# Patient Record
Sex: FEMALE | Race: WHITE | NOT HISPANIC OR LATINO | Employment: STUDENT | URBAN - METROPOLITAN AREA
[De-identification: names, ages, dates, MRNs, and addresses within clinical notes are randomized per-mention and may not be internally consistent; named-entity substitution may affect disease eponyms.]

---
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
## 2019-10-17 ENCOUNTER — DOCTOR'S OFFICE (OUTPATIENT)
Age: 19
Setting detail: OPHTHALMOLOGY
End: 2019-10-17
Payer: COMMERCIAL

## 2019-10-17 DIAGNOSIS — Z41.1: ICD-10-CM

## 2019-10-17 PROCEDURE — NO CHARGE N/C PROFESSIONAL COURTESY: Performed by: OPHTHALMOLOGY

## 2019-10-22 ASSESSMENT — REFRACTION_MANIFEST
OU_VA: 20/
OS_VA1: 20/
OS_VA2: 20/
OD_VA2: 20/
OD_VA1: 20/
OD_VA3: 20/
OD_VA2: 20/
OS_VA3: 20/
OS_VA2: 20/
OD_VA3: 20/
OS_VA1: 20/
OD_VA1: 20/
OU_VA: 20/
OS_VA3: 20/

## 2019-10-22 ASSESSMENT — REFRACTION_CURRENTRX
OD_OVR_VA: 20/
OS_OVR_VA: 20/
OS_OVR_VA: 20/
OD_OVR_VA: 20/
OS_OVR_VA: 20/
OD_OVR_VA: 20/

## 2019-10-22 ASSESSMENT — VISUAL ACUITY
OS_BCVA: 20/
OD_BCVA: 20/

## 2020-02-12 ENCOUNTER — DOCTOR'S OFFICE (OUTPATIENT)
Dept: URBAN - METROPOLITAN AREA CLINIC 150 | Facility: CLINIC | Age: 20
Setting detail: OPHTHALMOLOGY
End: 2020-02-12
Payer: COMMERCIAL

## 2020-02-12 DIAGNOSIS — B30.2: ICD-10-CM

## 2020-02-12 DIAGNOSIS — B30.0: ICD-10-CM

## 2020-02-12 DIAGNOSIS — B30.1: ICD-10-CM

## 2020-02-12 DIAGNOSIS — B30.3: ICD-10-CM

## 2020-02-12 PROCEDURE — NO CHARGE N/C PROFESSIONAL COURTESY: Performed by: OPHTHALMOLOGY

## 2020-02-12 ASSESSMENT — VISUAL ACUITY
OD_BCVA: 20/20
OS_BCVA: 20/20

## 2020-11-09 ENCOUNTER — OPTICAL OFFICE (OUTPATIENT)
Dept: URBAN - METROPOLITAN AREA CLINIC 134 | Facility: CLINIC | Age: 20
Setting detail: OPHTHALMOLOGY
End: 2020-11-09

## 2020-11-09 DIAGNOSIS — H52.223: ICD-10-CM

## 2020-11-09 PROBLEM — B30.1 VIRAL CONJUNCTIVITIS: Status: ACTIVE | Noted: 2020-02-12

## 2020-11-09 PROBLEM — B30.2 VIRAL CONJUNCTIVITIS: Status: ACTIVE | Noted: 2020-02-12

## 2020-11-09 PROBLEM — B30.3 VIRAL CONJUNCTIVITIS: Status: ACTIVE | Noted: 2020-02-12

## 2020-11-09 PROBLEM — B30.0 VIRAL CONJUNCTIVITIS: Status: ACTIVE | Noted: 2020-02-12

## 2020-11-09 PROCEDURE — V2103 SPHEROCYLINDR 4.00D/12-2.00D: HCPCS | Performed by: OPTOMETRIST

## 2020-11-09 PROCEDURE — V2750 ANTI-REFLECTIVE COATING: HCPCS | Performed by: OPTOMETRIST

## 2020-11-09 PROCEDURE — V2020 VISION SVCS FRAMES PURCHASES: HCPCS | Performed by: OPTOMETRIST

## 2020-11-23 ENCOUNTER — OFFICE VISIT (OUTPATIENT)
Dept: URGENT CARE | Facility: CLINIC | Age: 20
End: 2020-11-23
Payer: COMMERCIAL

## 2020-11-23 VITALS — OXYGEN SATURATION: 99 % | HEART RATE: 82 BPM | TEMPERATURE: 98.5 F | RESPIRATION RATE: 18 BRPM

## 2020-11-23 DIAGNOSIS — Z20.822 SUSPECTED COVID-19 VIRUS INFECTION: Primary | ICD-10-CM

## 2020-11-23 PROCEDURE — 99203 OFFICE O/P NEW LOW 30 MIN: CPT | Performed by: NURSE PRACTITIONER

## 2020-11-23 PROCEDURE — U0003 INFECTIOUS AGENT DETECTION BY NUCLEIC ACID (DNA OR RNA); SEVERE ACUTE RESPIRATORY SYNDROME CORONAVIRUS 2 (SARS-COV-2) (CORONAVIRUS DISEASE [COVID-19]), AMPLIFIED PROBE TECHNIQUE, MAKING USE OF HIGH THROUGHPUT TECHNOLOGIES AS DESCRIBED BY CMS-2020-01-R: HCPCS | Performed by: NURSE PRACTITIONER

## 2020-11-24 LAB — SARS-COV-2 RNA SPEC QL NAA+PROBE: NOT DETECTED

## 2020-11-25 ENCOUNTER — TELEPHONE (OUTPATIENT)
Dept: URGENT CARE | Facility: CLINIC | Age: 20
End: 2020-11-25

## 2020-11-29 ENCOUNTER — OFFICE VISIT (OUTPATIENT)
Dept: URGENT CARE | Facility: CLINIC | Age: 20
End: 2020-11-29
Payer: COMMERCIAL

## 2020-11-29 VITALS
BODY MASS INDEX: 20.98 KG/M2 | HEART RATE: 100 BPM | HEIGHT: 62 IN | TEMPERATURE: 98.5 F | RESPIRATION RATE: 18 BRPM | OXYGEN SATURATION: 100 % | DIASTOLIC BLOOD PRESSURE: 81 MMHG | WEIGHT: 114 LBS | SYSTOLIC BLOOD PRESSURE: 141 MMHG

## 2020-11-29 DIAGNOSIS — R30.0 DYSURIA: ICD-10-CM

## 2020-11-29 DIAGNOSIS — R31.9 URINARY TRACT INFECTION WITH HEMATURIA, SITE UNSPECIFIED: Primary | ICD-10-CM

## 2020-11-29 DIAGNOSIS — N39.0 URINARY TRACT INFECTION WITH HEMATURIA, SITE UNSPECIFIED: Primary | ICD-10-CM

## 2020-11-29 LAB
SL AMB  POCT GLUCOSE, UA: NEGATIVE
SL AMB LEUKOCYTE ESTERASE,UA: ABNORMAL
SL AMB POCT BILIRUBIN,UA: NEGATIVE
SL AMB POCT BLOOD,UA: ABNORMAL
SL AMB POCT CLARITY,UA: ABNORMAL
SL AMB POCT COLOR,UA: ABNORMAL
SL AMB POCT KETONES,UA: NEGATIVE
SL AMB POCT NITRITE,UA: POSITIVE
SL AMB POCT PH,UA: 7.5
SL AMB POCT SPECIFIC GRAVITY,UA: 1.02
SL AMB POCT URINE PROTEIN: 30
SL AMB POCT UROBILINOGEN: 0.2

## 2020-11-29 PROCEDURE — 81002 URINALYSIS NONAUTO W/O SCOPE: CPT | Performed by: PHYSICIAN ASSISTANT

## 2020-11-29 PROCEDURE — 87077 CULTURE AEROBIC IDENTIFY: CPT | Performed by: PHYSICIAN ASSISTANT

## 2020-11-29 PROCEDURE — G0382 LEV 3 HOSP TYPE B ED VISIT: HCPCS | Performed by: PHYSICIAN ASSISTANT

## 2020-11-29 PROCEDURE — S9083 URGENT CARE CENTER GLOBAL: HCPCS | Performed by: PHYSICIAN ASSISTANT

## 2020-11-29 PROCEDURE — 87086 URINE CULTURE/COLONY COUNT: CPT | Performed by: PHYSICIAN ASSISTANT

## 2020-11-29 RX ORDER — SULFAMETHOXAZOLE AND TRIMETHOPRIM 800; 160 MG/1; MG/1
1 TABLET ORAL EVERY 12 HOURS SCHEDULED
Qty: 14 TABLET | Refills: 0 | Status: SHIPPED | OUTPATIENT
Start: 2020-11-29 | End: 2020-12-06

## 2020-12-01 LAB — BACTERIA UR CULT: ABNORMAL

## 2020-12-02 ENCOUNTER — TELEPHONE (OUTPATIENT)
Dept: URGENT CARE | Facility: CLINIC | Age: 20
End: 2020-12-02

## 2020-12-02 DIAGNOSIS — R30.0 DYSURIA: Primary | ICD-10-CM

## 2020-12-02 RX ORDER — FLUCONAZOLE 150 MG/1
150 TABLET ORAL ONCE
Qty: 2 TABLET | Refills: 0 | Status: SHIPPED | OUTPATIENT
Start: 2020-12-02 | End: 2020-12-02

## 2022-05-09 ENCOUNTER — OPTICAL OFFICE (OUTPATIENT)
Dept: URBAN - METROPOLITAN AREA CLINIC 134 | Facility: CLINIC | Age: 22
Setting detail: OPHTHALMOLOGY
End: 2022-05-09

## 2022-05-09 DIAGNOSIS — H52.223: ICD-10-CM

## 2022-05-09 PROCEDURE — V2020T NON PRESCRIPTION SUNS: Performed by: OPHTHALMOLOGY

## 2022-10-28 ENCOUNTER — HOSPITAL ENCOUNTER (EMERGENCY)
Facility: HOSPITAL | Age: 22
Discharge: HOME | End: 2022-10-28
Attending: EMERGENCY MEDICINE
Payer: COMMERCIAL

## 2022-10-28 ENCOUNTER — APPOINTMENT (EMERGENCY)
Dept: ULTRASOUND IMAGING | Facility: HOSPITAL | Age: 22
End: 2022-10-28
Payer: COMMERCIAL

## 2022-10-28 VITALS
HEART RATE: 62 BPM | OXYGEN SATURATION: 99 % | TEMPERATURE: 97.8 F | BODY MASS INDEX: 25.76 KG/M2 | DIASTOLIC BLOOD PRESSURE: 72 MMHG | HEIGHT: 62 IN | SYSTOLIC BLOOD PRESSURE: 122 MMHG | RESPIRATION RATE: 18 BRPM | WEIGHT: 140 LBS

## 2022-10-28 DIAGNOSIS — N94.6 DYSMENORRHEA: Primary | ICD-10-CM

## 2022-10-28 LAB
ABO GROUP BLD: NORMAL
ABO GROUP BLD: NORMAL
ANION GAP SERPL CALCULATED.3IONS-SCNC: 7 MMOL/L (ref 4–13)
B-HCG SERPL-ACNC: <1 MIU/ML (ref 0–11.6)
BACTERIA UR QL AUTO: NORMAL /HPF
BASOPHILS # BLD AUTO: 0.03 THOUSANDS/ÂΜL (ref 0–0.1)
BASOPHILS NFR BLD AUTO: 1 % (ref 0–1)
BILIRUB UR QL STRIP: NEGATIVE
BLD GP AB SCN SERPL QL: NEGATIVE
BUN SERPL-MCNC: 11 MG/DL (ref 5–25)
CALCIUM SERPL-MCNC: 9 MG/DL (ref 8.4–10.2)
CHLORIDE SERPL-SCNC: 107 MMOL/L (ref 96–108)
CLARITY UR: CLEAR
CO2 SERPL-SCNC: 25 MMOL/L (ref 21–32)
COLOR UR: ABNORMAL
CREAT SERPL-MCNC: 0.83 MG/DL (ref 0.6–1.3)
EOSINOPHIL # BLD AUTO: 0.09 THOUSAND/ÂΜL (ref 0–0.61)
EOSINOPHIL NFR BLD AUTO: 2 % (ref 0–6)
ERYTHROCYTE [DISTWIDTH] IN BLOOD BY AUTOMATED COUNT: 14 % (ref 11.6–15.1)
GFR SERPL CREATININE-BSD FRML MDRD: 99 ML/MIN/1.73SQ M
GLUCOSE SERPL-MCNC: 101 MG/DL (ref 65–140)
GLUCOSE UR STRIP-MCNC: NEGATIVE MG/DL
HCT VFR BLD AUTO: 43.1 % (ref 34.8–46.1)
HGB BLD-MCNC: 14 G/DL (ref 11.5–15.4)
HGB UR QL STRIP.AUTO: ABNORMAL
IMM GRANULOCYTES # BLD AUTO: 0.01 THOUSAND/UL (ref 0–0.2)
IMM GRANULOCYTES NFR BLD AUTO: 0 % (ref 0–2)
KETONES UR STRIP-MCNC: NEGATIVE MG/DL
LEUKOCYTE ESTERASE UR QL STRIP: NEGATIVE
LYMPHOCYTES # BLD AUTO: 1.9 THOUSANDS/ÂΜL (ref 0.6–4.47)
LYMPHOCYTES NFR BLD AUTO: 31 % (ref 14–44)
MCH RBC QN AUTO: 28.1 PG (ref 26.8–34.3)
MCHC RBC AUTO-ENTMCNC: 32.5 G/DL (ref 31.4–37.4)
MCV RBC AUTO: 86 FL (ref 82–98)
MONOCYTES # BLD AUTO: 0.4 THOUSAND/ÂΜL (ref 0.17–1.22)
MONOCYTES NFR BLD AUTO: 7 % (ref 4–12)
NEUTROPHILS # BLD AUTO: 3.75 THOUSANDS/ÂΜL (ref 1.85–7.62)
NEUTS SEG NFR BLD AUTO: 59 % (ref 43–75)
NITRITE UR QL STRIP: NEGATIVE
NON-SQ EPI CELLS URNS QL MICRO: NORMAL /HPF
NRBC BLD AUTO-RTO: 0 /100 WBCS
PH UR STRIP.AUTO: 6 [PH]
PLATELET # BLD AUTO: 299 THOUSANDS/UL (ref 149–390)
PMV BLD AUTO: 11.3 FL (ref 8.9–12.7)
POTASSIUM SERPL-SCNC: 3.9 MMOL/L (ref 3.5–5.3)
PROT UR STRIP-MCNC: NEGATIVE MG/DL
RBC # BLD AUTO: 4.99 MILLION/UL (ref 3.81–5.12)
RBC #/AREA URNS AUTO: NORMAL /HPF
RH BLD: POSITIVE
RH BLD: POSITIVE
SODIUM SERPL-SCNC: 139 MMOL/L (ref 135–147)
SP GR UR STRIP.AUTO: 1.01 (ref 1–1.03)
SPECIMEN EXPIRATION DATE: NORMAL
UROBILINOGEN UR QL STRIP.AUTO: 0.2 E.U./DL
WBC # BLD AUTO: 6.18 THOUSAND/UL (ref 4.31–10.16)
WBC #/AREA URNS AUTO: NORMAL /HPF

## 2022-10-28 PROCEDURE — 80048 BASIC METABOLIC PNL TOTAL CA: CPT | Performed by: PHYSICIAN ASSISTANT

## 2022-10-28 PROCEDURE — 36415 COLL VENOUS BLD VENIPUNCTURE: CPT | Performed by: PHYSICIAN ASSISTANT

## 2022-10-28 PROCEDURE — 85025 COMPLETE CBC W/AUTO DIFF WBC: CPT | Performed by: PHYSICIAN ASSISTANT

## 2022-10-28 PROCEDURE — 84702 CHORIONIC GONADOTROPIN TEST: CPT | Performed by: PHYSICIAN ASSISTANT

## 2022-10-28 PROCEDURE — 86901 BLOOD TYPING SEROLOGIC RH(D): CPT | Performed by: PHYSICIAN ASSISTANT

## 2022-10-28 PROCEDURE — 76856 US EXAM PELVIC COMPLETE: CPT

## 2022-10-28 PROCEDURE — 76830 TRANSVAGINAL US NON-OB: CPT

## 2022-10-28 PROCEDURE — 86850 RBC ANTIBODY SCREEN: CPT | Performed by: PHYSICIAN ASSISTANT

## 2022-10-28 PROCEDURE — 86900 BLOOD TYPING SEROLOGIC ABO: CPT | Performed by: PHYSICIAN ASSISTANT

## 2022-10-28 PROCEDURE — 81001 URINALYSIS AUTO W/SCOPE: CPT | Performed by: PHYSICIAN ASSISTANT

## 2022-10-28 PROCEDURE — 81003 URINALYSIS AUTO W/O SCOPE: CPT | Performed by: PHYSICIAN ASSISTANT

## 2022-10-28 RX ORDER — IBUPROFEN 600 MG/1
600 TABLET ORAL ONCE
Status: DISCONTINUED | OUTPATIENT
Start: 2022-10-28 | End: 2022-10-28 | Stop reason: HOSPADM

## 2022-10-28 RX ORDER — KETOROLAC TROMETHAMINE 30 MG/ML
15 INJECTION, SOLUTION INTRAMUSCULAR; INTRAVENOUS ONCE
Status: DISCONTINUED | OUTPATIENT
Start: 2022-10-28 | End: 2022-10-28

## 2022-10-28 RX ADMIN — SODIUM CHLORIDE 1000 ML: 0.9 INJECTION, SOLUTION INTRAVENOUS at 08:50

## 2022-10-28 NOTE — ED PROVIDER NOTES
History  Chief Complaint   Patient presents with   • Abdominal Cramping   • Menstrual Problem     Heavy bleeding with clots     26-year-old female presents to the emergency department seeking evaluation for abnormal menses and pelvic pain cramping  She does report having 2 prior pregnancies that her reported to be uncomplicated  No prior miscarriages  She does report that there is a limited possibility she may pregnant  Overall she is well-appearing in no acute distress  She is established with OBGYN  She states that her menses this morning was much heavier than usual and that she soaked through 1 pad in the early morning hours  She states that since then the bleeding has slowed considerably  She does report passing a lot of clot like material with her heavy bleeding  Allergies reviewed          Prior to Admission Medications   Prescriptions Last Dose Informant Patient Reported? Taking? AZO-CRANBERRY PO Not Taking at Unknown time Self Yes No   Sig: Take by mouth   Patient not taking: Reported on 10/28/2022   drospirenone-estradiol (ANGELIQ) 0 5-1 MG per tablet Not Taking at Unknown time Self Yes No   Sig: Take 1 tablet by mouth daily   Patient not taking: Reported on 10/28/2022      Facility-Administered Medications: None       Past Medical History:   Diagnosis Date   • Kidney infection        History reviewed  No pertinent surgical history  History reviewed  No pertinent family history  I have reviewed and agree with the history as documented  E-Cigarette/Vaping     E-Cigarette/Vaping Substances     Social History     Tobacco Use   • Smoking status: Current Every Day Smoker     Types: E-Cigarettes   • Smokeless tobacco: Never Used   Substance Use Topics   • Alcohol use: Not Currently   • Drug use: Never       Review of Systems   Constitutional: Negative for chills, fatigue and fever     HENT: Negative for congestion, ear pain, rhinorrhea, sinus pressure, sneezing, sore throat and trouble swallowing  Eyes: Negative for discharge and itching  Respiratory: Negative for cough, chest tightness, shortness of breath, wheezing and stridor  Cardiovascular: Negative for chest pain and palpitations  Gastrointestinal: Negative for abdominal pain, diarrhea, nausea and vomiting  Genitourinary: Positive for menstrual problem  Neurological: Negative for dizziness, syncope, numbness and headaches  All other systems reviewed and are negative  Physical Exam  Physical Exam  Vitals and nursing note reviewed  Constitutional:       General: She is not in acute distress  Appearance: She is well-developed  She is not diaphoretic  HENT:      Head: Normocephalic and atraumatic  Right Ear: External ear normal       Left Ear: External ear normal       Nose: Nose normal    Eyes:      Conjunctiva/sclera: Conjunctivae normal       Pupils: Pupils are equal, round, and reactive to light  Cardiovascular:      Rate and Rhythm: Normal rate and regular rhythm  Heart sounds: Normal heart sounds  No murmur heard  No friction rub  No gallop  Pulmonary:      Effort: Pulmonary effort is normal  No respiratory distress  Breath sounds: Normal breath sounds  No stridor  No wheezing or rales  Abdominal:      General: Bowel sounds are normal  There is no distension  Palpations: Abdomen is soft  Tenderness: There is no abdominal tenderness  There is no guarding  Musculoskeletal:         General: No tenderness  Normal range of motion  Cervical back: Normal range of motion  Skin:     General: Skin is warm  Capillary Refill: Capillary refill takes less than 2 seconds  Neurological:      Mental Status: She is alert and oriented to person, place, and time           Vital Signs  ED Triage Vitals [10/28/22 0831]   Temperature Pulse Respirations Blood Pressure SpO2   97 8 °F (36 6 °C) 70 16 141/82 98 %      Temp Source Heart Rate Source Patient Position - Orthostatic VS BP Location FiO2 (%)   Temporal Monitor Sitting Left arm --      Pain Score       7           Vitals:    10/28/22 0831 10/28/22 1105   BP: 141/82 122/72   Pulse: 70 62   Patient Position - Orthostatic VS: Sitting Sitting         Visual Acuity      ED Medications  Medications   sodium chloride 0 9 % bolus 1,000 mL (0 mL Intravenous Stopped 10/28/22 1035)       Diagnostic Studies  Results Reviewed     Procedure Component Value Units Date/Time    Urine Microscopic [[id number]]  (Normal) Collected: 10/28/22 1103    Lab Status: Final result Specimen: Urine, Clean Catch Updated: 10/28/22 1125     RBC, UA 0-1 /hpf      WBC, UA None Seen /hpf      Epithelial Cells Occasional /hpf      Bacteria, UA None Seen /hpf     UA (URINE) with reflex to Scope [[id number]]  (Abnormal) Collected: 10/28/22 1103    Lab Status: Final result Specimen: Urine, Clean Catch Updated: 10/28/22 1111     Color, UA Straw     Clarity, UA Clear     Specific Gravity, UA 1 010     pH, UA 6 0     Leukocytes, UA Negative     Nitrite, UA Negative     Protein, UA Negative mg/dl      Glucose, UA Negative mg/dl      Ketones, UA Negative mg/dl      Urobilinogen, UA 0 2 E U /dl      Bilirubin, UA Negative     Occult Blood, UA 1+    Basic metabolic panel [[id number]] Collected: 10/28/22 0851    Lab Status: Final result Specimen: Blood from Arm, Left Updated: 10/28/22 0926     Sodium 139 mmol/L      Potassium 3 9 mmol/L      Chloride 107 mmol/L      CO2 25 mmol/L      ANION GAP 7 mmol/L      BUN 11 mg/dL      Creatinine 0 83 mg/dL      Glucose 101 mg/dL      Calcium 9 0 mg/dL      eGFR 99 ml/min/1 73sq m     Narrative:      Neftali guidelines for Chronic Kidney Disease (CKD):   •  Stage 1 with normal or high GFR (GFR > 90 mL/min/1 73 square meters)  •  Stage 2 Mild CKD (GFR = 60-89 mL/min/1 73 square meters)  •  Stage 3A Moderate CKD (GFR = 45-59 mL/min/1 73 square meters)  •  Stage 3B Moderate CKD (GFR = 30-44 mL/min/1 73 square meters)  • Stage 4 Severe CKD (GFR = 15-29 mL/min/1 73 square meters)  •  Stage 5 End Stage CKD (GFR <15 mL/min/1 73 square meters)  Note: GFR calculation is accurate only with a steady state creatinine    hCG, quantitative [[id number]]  (Normal) Collected: 10/28/22 0851    Lab Status: Final result Specimen: Blood from Arm, Left Updated: 10/28/22 0926     HCG, Quant <1 mIU/mL     Narrative:       Expected Ranges:     Approximate               Approximate HCG  Gestation age          Concentration ( mIU/mL)  _____________          ______________________   Mariana Najjar                      HCG values  0 2-1                       5-50  1-2                           2-3                         100-5000  3-4                         500-23212  4-5                         1000-57291  5-6                         [id number]  6-8                         16986-247549  8-12                        [id number]      CBC and differential [[id number]] Collected: 10/28/22 0851    Lab Status: Final result Specimen: Blood from Arm, Left Updated: 10/28/22 0858     WBC 6 18 Thousand/uL      RBC 4 99 Million/uL      Hemoglobin 14 0 g/dL      Hematocrit 43 1 %      MCV 86 fL      MCH 28 1 pg      MCHC 32 5 g/dL      RDW 14 0 %      MPV 11 3 fL      Platelets 537 Thousands/uL      nRBC 0 /100 WBCs      Neutrophils Relative 59 %      Immat GRANS % 0 %      Lymphocytes Relative 31 %      Monocytes Relative 7 %      Eosinophils Relative 2 %      Basophils Relative 1 %      Neutrophils Absolute 3 75 Thousands/µL      Immature Grans Absolute 0 01 Thousand/uL      Lymphocytes Absolute 1 90 Thousands/µL      Monocytes Absolute 0 40 Thousand/µL      Eosinophils Absolute 0 09 Thousand/µL      Basophils Absolute 0 03 Thousands/µL                  US pelvis complete w transvaginal   Final Result by Addison Willett MD (10/28 1103)          1    Mildly distended, but not abnormally thickened endometrium with a small amount of internal fluid and echogenic material   Findings likely represent a small amount of blood products/clot within the endometrium  A small endometrial synechiae is not    excluded  Further clinical assessment and follow-up recommended  2   Sonographically normal ovaries  Workstation performed: [id number]                    Procedures  Procedures         ED Course  ED Course as of 10/28/22 1430   Fri Oct 28, 2022   1110 2 prior pregnancies, no complicaitons  No miscarriages, no abortions                               SBIRT 22yo+    Flowsheet Row Most Recent Value   SBIRT (25 yo +)    In order to provide better care to our patients, we are screening all of our patients for alcohol and drug use  Would it be okay to ask you these screening questions? Yes Filed at: 10/28/2022 0856   Initial Alcohol Screen: US AUDIT-C     1  How often do you have a drink containing alcohol? 0 Filed at: 10/28/2022 0856   2  How many drinks containing alcohol do you have on a typical day you are drinking? 0 Filed at: 10/28/2022 0856   3b  FEMALE Any Age, or MALE 65+: How often do you have 4 or more drinks on one occassion? 0 Filed at: 10/28/2022 0856   Audit-C Score 0 Filed at: 10/28/2022 6244   FERNIE: How many times in the past year have you    Used an illegal drug or used a prescription medication for non-medical reasons? Never Filed at: 10/28/2022 0856                    MDM  Number of Diagnoses or Management Options  Dysmenorrhea  Diagnosis management comments: Patient overall is well-appearing in no acute distress  Concern for possible uterine adhesions based on ultrasound imaging  Recommend close follow-up with OBGYN  Patient verbalized understanding of the need for close OBGYN follow-up  Patient is established with OBGYN as well  Patient reports that her initial heavy bleeding has improved considerably she no longer has heavy bleeding  Patient educated regarding their diagnosis and given return and follow-up instructions   Patient was advised to returned to the ED with worsening symptoms or concerns  Patient is understanding of and in agreement with the treatment plan  There are no questions at the time of discharge  Amount and/or Complexity of Data Reviewed  Clinical lab tests: ordered and reviewed  Tests in the radiology section of CPT®: ordered and reviewed    Risk of Complications, Morbidity, and/or Mortality  Presenting problems: low  Diagnostic procedures: low  Management options: low        Disposition  Final diagnoses:   Dysmenorrhea     Time reflects when diagnosis was documented in both MDM as applicable and the Disposition within this note     Time User Action Codes Description Comment    10/28/2022 11:09 AM Suraj Taylor Add [N94 6] Dysmenorrhea       ED Disposition     ED Disposition   Discharge    Condition   Stable    Date/Time   Fri Oct 28, 2022 11:09 AM    Comment   [address] discharge to home/self care  Follow-up Information    None         Discharge Medication List as of 10/28/2022 11:09 AM      CONTINUE these medications which have NOT CHANGED    Details   AZO-CRANBERRY PO Take by mouth, Historical Med      drospirenone-estradiol (ANGELIQ) 0 5-1 MG per tablet Take 1 tablet by mouth daily, Historical Med             No discharge procedures on file      PDMP Review     None          ED Provider  Electronically Signed by           Anthony Garza PA-C  10/28/22 4081

## 2022-10-28 NOTE — DISCHARGE INSTRUCTIONS
US results are as follows:  1  Mildly distended, but not abnormally thickened endometrium with a small amount of internal fluid and echogenic material   Findings likely represent a small amount of blood products/clot within the endometrium  A small endometrial synechiae is not   excluded  Further clinical assessment and follow-up recommended  2   Sonographically normal ovaries      Please follow up with your OBGYN

## 2022-10-28 NOTE — Clinical Note
Isaac Castañedalier was seen and treated in our emergency department on 10/28/2022  Diagnosis:     Lucila Lizarraga  may return to work on return date  She may return on this date: 10/31/2022         If you have any questions or concerns, please don't hesitate to call        Cherie Mendiola RN    ______________________________           _______________          _______________  Hospital Representative                              Date                                Time

## 2023-10-30 ENCOUNTER — HOSPITAL ENCOUNTER (EMERGENCY)
Facility: HOSPITAL | Age: 23
Discharge: ANOTHER HOSPITAL | End: 2023-10-30
Attending: EMERGENCY MEDICINE
Payer: COMMERCIAL

## 2023-10-30 VITALS
DIASTOLIC BLOOD PRESSURE: 72 MMHG | BODY MASS INDEX: 26.5 KG/M2 | OXYGEN SATURATION: 100 % | TEMPERATURE: 97.6 F | WEIGHT: 144 LBS | HEART RATE: 82 BPM | HEIGHT: 62 IN | SYSTOLIC BLOOD PRESSURE: 124 MMHG | RESPIRATION RATE: 16 BRPM

## 2023-10-30 DIAGNOSIS — O36.8120 DECREASED FETAL MOVEMENTS IN SECOND TRIMESTER, SINGLE OR UNSPECIFIED FETUS: Primary | ICD-10-CM

## 2023-10-30 PROCEDURE — 99283 EMERGENCY DEPT VISIT LOW MDM: CPT

## 2023-10-30 PROCEDURE — 99285 EMERGENCY DEPT VISIT HI MDM: CPT | Performed by: PHYSICIAN ASSISTANT

## 2023-10-30 NOTE — ED PROVIDER NOTES
History  Chief Complaint   Patient presents with    Decreased Fetal Movement     Patient without any pain, but hasn't felt baby move as normal last few hours. Patient is a 18-year-old G3, P2 female at approximately 23 weeks gestation presenting to the emergency department for evaluation of decreased fetal movement. She states she first noticed the symptoms yesterday. She only felt the fetus kick about 10 times since yesterday, this is abnormal for her as the baby is typically very active. She states that she tried drinking water and juice without any increased activity. She is denying any abdominal pain, abdominal cramping, vaginal bleeding, vaginal discharge, pelvic pain, fevers, chills, nausea, vomiting, diarrhea. She states that she does follow-up with OB/GYN, plans to deliver at [facility].  She is taking prenatals as prescribed. No other complaints at this time. Prior to Admission Medications   Prescriptions Last Dose Informant Patient Reported? Taking? AZO-CRANBERRY PO Not Taking Self Yes No   Sig: Take by mouth   Patient not taking: Reported on 10/28/2022   drospirenone-estradiol (ANGELIQ) 0.5-1 MG per tablet Not Taking Self Yes No   Sig: Take 1 tablet by mouth daily   Patient not taking: Reported on 10/28/2022      Facility-Administered Medications: None       Past Medical History:   Diagnosis Date    Kidney infection        History reviewed. No pertinent surgical history. History reviewed. No pertinent family history. I have reviewed and agree with the history as documented. E-Cigarette/Vaping     E-Cigarette/Vaping Substances     Social History     Tobacco Use    Smoking status: Every Day     Types: E-Cigarettes    Smokeless tobacco: Never   Substance Use Topics    Alcohol use: Not Currently    Drug use: Never       Review of Systems   Constitutional:  Negative for chills and fever. HENT:  Negative for congestion and sore throat.     Respiratory:  Negative for cough, chest tightness and shortness of breath. Cardiovascular:  Negative for chest pain. Gastrointestinal:  Negative for abdominal pain, diarrhea, nausea and vomiting. Genitourinary:  Negative for dysuria, hematuria, pelvic pain, vaginal bleeding, vaginal discharge and vaginal pain. Neurological:  Negative for dizziness, syncope, light-headedness and headaches. All other systems reviewed and are negative. Physical Exam  Physical Exam  Vitals reviewed. Constitutional:       General: She is not in acute distress. Appearance: Normal appearance. She is normal weight. She is not ill-appearing, toxic-appearing or diaphoretic. HENT:      Head: Normocephalic and atraumatic. Right Ear: External ear normal.      Left Ear: External ear normal.   Eyes:      General: No scleral icterus. Right eye: No discharge. Left eye: No discharge. Extraocular Movements: Extraocular movements intact. Conjunctiva/sclera: Conjunctivae normal.   Cardiovascular:      Rate and Rhythm: Normal rate and regular rhythm. Heart sounds: Normal heart sounds. No murmur heard. No friction rub. No gallop. Pulmonary:      Effort: Pulmonary effort is normal. No respiratory distress. Breath sounds: Normal breath sounds. No stridor. No wheezing, rhonchi or rales. Abdominal:      General: Abdomen is flat. Palpations: Abdomen is soft. Tenderness: There is no abdominal tenderness. There is no guarding or rebound. Comments: Gravid abdomen consistent with 21 weeks gestational age. Uterine fundus palpated a few centimeters above the umbilicus. Musculoskeletal:      Cervical back: Normal range of motion and neck supple. Skin:     General: Skin is warm and dry. Neurological:      Mental Status: She is alert and oriented to person, place, and time.    Psychiatric:         Mood and Affect: Mood normal.         Behavior: Behavior normal.         Vital Signs  ED Triage Vitals Temperature Pulse Respirations Blood Pressure SpO2   10/30/23 0753 10/30/23 0749 10/30/23 0749 10/30/23 0749 10/30/23 0749   97.6 °F (36.4 °C) 82 16 124/72 100 %      Temp Source Heart Rate Source Patient Position - Orthostatic VS BP Location FiO2 (%)   10/30/23 0753 10/30/23 0749 10/30/23 0749 10/30/23 0749 --   Temporal Monitor Sitting Left arm       Pain Score       10/30/23 0749       No Pain           Vitals:    10/30/23 0749   BP: 124/72   Pulse: 82   Patient Position - Orthostatic VS: Sitting         Visual Acuity      ED Medications  Medications - No data to display    Diagnostic Studies  Results Reviewed       None                   No orders to display              Procedures  Procedures         ED Course  ED Course as of 10/30/23 1007   Mon Oct 30, 2023   0831 Transabdominal ultrasound performed myself, fetal heart tones obtained at 142 bpm.                               SBIRT 20yo+      Flowsheet Row Most Recent Value   Initial Alcohol Screen: US AUDIT-C     1. How often do you have a drink containing alcohol? 0 Filed at: 10/30/2023 0751   2. How many drinks containing alcohol do you have on a typical day you are drinking? 0 Filed at: 10/30/2023 0751   3b. FEMALE Any Age, or MALE 65+: How often do you have 4 or more drinks on one occassion? 0 Filed at: 10/30/2023 0751   Audit-C Score 0 Filed at: 10/30/2023 6014   FERNIE: How many times in the past year have you. .. Used an illegal drug or used a prescription medication for non-medical reasons? Never Filed at: 10/30/2023 3787                      Medical Decision Making  Patient presenting to the emergency department for evaluation of decreased fetal movement in the setting of 23 weeks gestation. Upon arrival patient appears comfortable. She does not appear to be in any acute distress. Her vital signs are unremarkable. No hypertension or concern for preeclampsia. No vaginal bleeding, vaginal discharge.   Abdomen gravid consistent with gestational age and nontender. Transabdominal ultrasound performed by myself revealed fetal heart tones of 142 bpm.  Given patient's decreased fetal movement and a viable pregnancy she was ultimately transferred to a higher level of care to be evaluated by OB/GYN. Patient requesting to be transferred to [facility] as that is where she plans to deliver. Patient was accepted for transfer. She was in stable condition at time of transfer.              Disposition  Final diagnoses:   Decreased fetal movements in second trimester, single or unspecified fetus     Time reflects when diagnosis was documented in both MDM as applicable and the Disposition within this note       Time User Action Codes Description Comment    10/30/2023  8:19 AM Kacey Urbina Add [V41.1248] Decreased fetal movements in second trimester, single or unspecified fetus           ED Disposition       ED Disposition   Transfer to [address]   --    Date/Time   Mon Oct 30, 2023 9066    Danny Franco should be transferred out to [facility].               MD Documentation      [facility] Most Recent Value   Patient Condition The patient has been stabilized such that within reasonable medical probability, no material deterioration of the patient condition or the condition of the unborn child(hernan) is likely to result from the transfer   Reason for Transfer Level of Care needed not available at this facility   Benefits of Transfer Specialized equipment and/or services available at the receiving facility (Include comment)________________________  [OB/GYN]   Risks of Transfer Potential for delay in receiving treatment, Potential deterioration of medical condition, Possible worsening of condition or death during transfer, Increased discomfort during transfer   Accepting Physician Dr. Clarence Reese Name, [address]    (Name & Tel number) PACS   Transported by [facility] and Unit #) POV   Sending MD Dr. Harriett Wells   Provider Certification General risk, such as traffic hazards, adverse weather conditions, rough terrain or turbulence, possible failure of equipment (including vehicle or aircraft), or consequences of actions of persons outside the control of the transport personnel, Unanticipated needs of medical equipment and personnel during transport, Risk of worsening condition          RN Documentation      [address] Name, [facility]  [address] [facility]    (Name & Tel number) PACS   Report Given to [facility] by [place] and Unit #) POV          Follow-up Information    None         Discharge Medication List as of 10/30/2023  9:15 AM        CONTINUE these medications which have NOT CHANGED    Details   AZO-CRANBERRY PO Take by mouth, Historical Med      drospirenone-estradiol (ANGELIQ) 0.5-1 MG per tablet Take 1 tablet by mouth daily, Historical Med             No discharge procedures on file.     PDMP Review       None            ED Provider  Electronically Signed by             Juan Lock PA-C  10/30/23 1007

## 2023-10-30 NOTE — EMTALA/ACUTE CARE TRANSFER
[address] [address]  Dept: [phone]      EMTALA TRANSFER CONSENT    NAME Issac Parra                                         2000                              MRN [id number]    I have been informed of my rights regarding examination, treatment, and transfer   by Dr. Kal Vega DO    Benefits: Specialized equipment and/or services available at the receiving facility (Include comment)________________________ (OB/GYN)    Risks: Potential for delay in receiving treatment, Potential deterioration of medical condition, Possible worsening of condition or death during transfer, Increased discomfort during transfer      Consent for Transfer:  I acknowledge that my medical condition has been evaluated and explained to me by the emergency department physician or other qualified medical person and/or my attending physician, who has recommended that I be transferred to the service of  Accepting Physician: Dr. Gordan Denver at [address] [place] [address] [address] Name, [address] : [facility]. The above potential benefits of such transfer, the potential risks associated with such transfer, and the probable risks of not being transferred have been explained to me, and I fully understand them. The doctor has explained that, in my case, the benefits of transfer outweigh the risks. I agree to be transferred. I authorize the performance of emergency medical procedures and treatments upon me in both transit and upon arrival at the receiving facility. Additionally, I authorize the release of any and all medical records to the receiving facility and request they be transported with me, if possible. I understand that the safest mode of transportation during a medical emergency is an ambulance and that the Hospital advocates the use of this mode of transport.  Risks of traveling to the receiving facility by car, including absence of medical control, life sustaining equipment, such as oxygen, and medical personnel has been explained to me and I fully understand them. (JOSE CORRECT BOX BELOW)  [  ]  I consent to the stated transfer and to be transported by ambulance/helicopter. [  ]  I consent to the stated transfer, but refuse transportation by ambulance and accept full responsibility for my transportation by car. I understand the risks of non-ambulance transfers and I exonerate the Hospital and its staff from any deterioration in my condition that results from this refusal.    X___________________________________________    DATE  10/30/23  TIME________  Signature of patient or legally responsible individual signing on patient behalf           RELATIONSHIP TO PATIENT_________________________          Provider Certification    NAME Lory Shipman                                         2000                              MRN [id number]    A medical screening exam was performed on the above named patient. Based on the examination:    Condition Necessitating Transfer The encounter diagnosis was Decreased fetal movements in second trimester, single or unspecified fetus.     Patient Condition: The patient has been stabilized such that within reasonable medical probability, no material deterioration of the patient condition or the condition of the unborn child(hernan) is likely to result from the transfer    Reason for Transfer: Level of Care needed not available at this facility    Transfer Requirements: Facility [address] [place] [address] available and qualified personnel available for treatment as acknowledged by PACS  Agreed to accept transfer and to provide appropriate medical treatment as acknowledged by       Dr. Kobe Herring  Appropriate medical records of the examination and treatment of the patient are provided at the time of transfer   [address] _______  Transfer will be performed by qualified personnel from [facility]  and appropriate transfer equipment as required, including the use of necessary and appropriate life support measures. Provider Certification: I have examined the patient and explained the following risks and benefits of being transferred/refusing transfer to the patient/family:  General risk, such as traffic hazards, adverse weather conditions, rough terrain or turbulence, possible failure of equipment (including vehicle or aircraft), or consequences of actions of persons outside the control of the transport personnel, Unanticipated needs of medical equipment and personnel during transport, Risk of worsening condition      Based on these reasonable risks and benefits to the patient and/or the unborn child(hernan), and based upon the information available at the time of the patient’s examination, I certify that the medical benefits reasonably to be expected from the provision of appropriate medical treatments at another medical facility outweigh the increasing risks, if any, to the individual’s medical condition, and in the case of labor to the unborn child, from effecting the transfer.     X____________________________________________ DATE 10/30/23        TIME_______      ORIGINAL - SEND TO MEDICAL RECORDS   COPY - SEND WITH PATIENT DURING TRANSFER

## 2024-10-18 ENCOUNTER — HOSPITAL ENCOUNTER (EMERGENCY)
Facility: HOSPITAL | Age: 24
Discharge: HOME | End: 2024-10-18
Attending: EMERGENCY MEDICINE
Payer: COMMERCIAL

## 2024-10-18 ENCOUNTER — APPOINTMENT (EMERGENCY)
Dept: CT IMAGING | Facility: HOSPITAL | Age: 24
End: 2024-10-18
Payer: COMMERCIAL

## 2024-10-18 VITALS
HEART RATE: 73 BPM | BODY MASS INDEX: 26.5 KG/M2 | HEIGHT: 62 IN | DIASTOLIC BLOOD PRESSURE: 59 MMHG | WEIGHT: 144 LBS | OXYGEN SATURATION: 100 % | RESPIRATION RATE: 16 BRPM | TEMPERATURE: 97.3 F | SYSTOLIC BLOOD PRESSURE: 130 MMHG

## 2024-10-18 DIAGNOSIS — L08.9 FACIAL INFECTION: Primary | ICD-10-CM

## 2024-10-18 LAB
ALBUMIN SERPL BCG-MCNC: 4.4 G/DL (ref 3.5–5)
ALP SERPL-CCNC: 86 U/L (ref 34–104)
ALT SERPL W P-5'-P-CCNC: 11 U/L (ref 7–52)
ANION GAP SERPL CALCULATED.3IONS-SCNC: 6 MMOL/L (ref 4–13)
AST SERPL W P-5'-P-CCNC: 13 U/L (ref 13–39)
BASOPHILS # BLD AUTO: 0.05 THOUSANDS/ΜL (ref 0–0.1)
BASOPHILS NFR BLD AUTO: 1 % (ref 0–1)
BILIRUB SERPL-MCNC: 0.31 MG/DL (ref 0.2–1)
BUN SERPL-MCNC: 9 MG/DL (ref 5–25)
CALCIUM SERPL-MCNC: 9.6 MG/DL (ref 8.4–10.2)
CHLORIDE SERPL-SCNC: 106 MMOL/L (ref 96–108)
CO2 SERPL-SCNC: 27 MMOL/L (ref 21–32)
CREAT SERPL-MCNC: 0.68 MG/DL (ref 0.6–1.3)
EOSINOPHIL # BLD AUTO: 0.09 THOUSAND/ΜL (ref 0–0.61)
EOSINOPHIL NFR BLD AUTO: 1 % (ref 0–6)
ERYTHROCYTE [DISTWIDTH] IN BLOOD BY AUTOMATED COUNT: 13.4 % (ref 11.6–15.1)
EXT PREGNANCY TEST URINE: NEGATIVE
EXT. CONTROL: NORMAL
GFR SERPL CREATININE-BSD FRML MDRD: 121 ML/MIN/1.73SQ M
GLUCOSE SERPL-MCNC: 88 MG/DL (ref 65–140)
HCT VFR BLD AUTO: 43.8 % (ref 34.8–46.1)
HGB BLD-MCNC: 14.3 G/DL (ref 11.5–15.4)
IMM GRANULOCYTES # BLD AUTO: 0.05 THOUSAND/UL (ref 0–0.2)
IMM GRANULOCYTES NFR BLD AUTO: 1 % (ref 0–2)
LYMPHOCYTES # BLD AUTO: 2.59 THOUSANDS/ΜL (ref 0.6–4.47)
LYMPHOCYTES NFR BLD AUTO: 26 % (ref 14–44)
MCH RBC QN AUTO: 27.2 PG (ref 26.8–34.3)
MCHC RBC AUTO-ENTMCNC: 32.6 G/DL (ref 31.4–37.4)
MCV RBC AUTO: 83 FL (ref 82–98)
MONOCYTES # BLD AUTO: 0.55 THOUSAND/ΜL (ref 0.17–1.22)
MONOCYTES NFR BLD AUTO: 6 % (ref 4–12)
NEUTROPHILS # BLD AUTO: 6.55 THOUSANDS/ΜL (ref 1.85–7.62)
NEUTS SEG NFR BLD AUTO: 65 % (ref 43–75)
NRBC BLD AUTO-RTO: 0 /100 WBCS
PLATELET # BLD AUTO: 383 THOUSANDS/UL (ref 149–390)
PMV BLD AUTO: 11.1 FL (ref 8.9–12.7)
POTASSIUM SERPL-SCNC: 3.9 MMOL/L (ref 3.5–5.3)
PROT SERPL-MCNC: 7.7 G/DL (ref 6.4–8.4)
RBC # BLD AUTO: 5.25 MILLION/UL (ref 3.81–5.12)
S PYO DNA THROAT QL NAA+PROBE: NOT DETECTED
SODIUM SERPL-SCNC: 139 MMOL/L (ref 135–147)
WBC # BLD AUTO: 9.88 THOUSAND/UL (ref 4.31–10.16)

## 2024-10-18 PROCEDURE — 85025 COMPLETE CBC W/AUTO DIFF WBC: CPT | Performed by: PHYSICIAN ASSISTANT

## 2024-10-18 PROCEDURE — 70491 CT SOFT TISSUE NECK W/DYE: CPT

## 2024-10-18 PROCEDURE — 96375 TX/PRO/DX INJ NEW DRUG ADDON: CPT

## 2024-10-18 PROCEDURE — 96365 THER/PROPH/DIAG IV INF INIT: CPT

## 2024-10-18 PROCEDURE — 36415 COLL VENOUS BLD VENIPUNCTURE: CPT | Performed by: PHYSICIAN ASSISTANT

## 2024-10-18 PROCEDURE — 80053 COMPREHEN METABOLIC PANEL: CPT | Performed by: PHYSICIAN ASSISTANT

## 2024-10-18 PROCEDURE — 87651 STREP A DNA AMP PROBE: CPT | Performed by: PHYSICIAN ASSISTANT

## 2024-10-18 PROCEDURE — 99284 EMERGENCY DEPT VISIT MOD MDM: CPT

## 2024-10-18 PROCEDURE — 81025 URINE PREGNANCY TEST: CPT | Performed by: PHYSICIAN ASSISTANT

## 2024-10-18 PROCEDURE — 99284 EMERGENCY DEPT VISIT MOD MDM: CPT | Performed by: PHYSICIAN ASSISTANT

## 2024-10-18 RX ORDER — HYDROCODONE BITARTRATE AND ACETAMINOPHEN 5; 325 MG/1; MG/1
1 TABLET ORAL ONCE
Status: COMPLETED | OUTPATIENT
Start: 2024-10-18 | End: 2024-10-18

## 2024-10-18 RX ORDER — DEXAMETHASONE SODIUM PHOSPHATE 10 MG/ML
10 INJECTION, SOLUTION INTRAMUSCULAR; INTRAVENOUS ONCE
Status: COMPLETED | OUTPATIENT
Start: 2024-10-18 | End: 2024-10-18

## 2024-10-18 RX ORDER — CLINDAMYCIN PHOSPHATE 600 MG/50ML
600 INJECTION, SOLUTION INTRAVENOUS ONCE
Status: COMPLETED | OUTPATIENT
Start: 2024-10-18 | End: 2024-10-18

## 2024-10-18 RX ORDER — HYDROCODONE BITARTRATE AND ACETAMINOPHEN 5; 325 MG/1; MG/1
1 TABLET ORAL EVERY 6 HOURS PRN
Qty: 8 TABLET | Refills: 0 | Status: SHIPPED | OUTPATIENT
Start: 2024-10-18

## 2024-10-18 RX ORDER — OXYCODONE HYDROCHLORIDE 5 MG/1
5 TABLET ORAL EVERY 6 HOURS PRN
Qty: 6 TABLET | Refills: 0 | Status: SHIPPED | OUTPATIENT
Start: 2024-10-18 | End: 2024-10-18

## 2024-10-18 RX ADMIN — CLINDAMYCIN PHOSPHATE 600 MG: 600 INJECTION, SOLUTION INTRAVENOUS at 11:19

## 2024-10-18 RX ADMIN — IOHEXOL 85 ML: 350 INJECTION, SOLUTION INTRAVENOUS at 11:42

## 2024-10-18 RX ADMIN — HYDROCODONE BITARTRATE AND ACETAMINOPHEN 1 TABLET: 5; 325 TABLET ORAL at 12:40

## 2024-10-18 RX ADMIN — DEXAMETHASONE SODIUM PHOSPHATE 10 MG: 10 INJECTION, SOLUTION INTRAMUSCULAR; INTRAVENOUS at 11:04

## 2024-10-18 RX ADMIN — SODIUM CHLORIDE 1000 ML: 0.9 INJECTION, SOLUTION INTRAVENOUS at 11:04

## 2024-10-18 NOTE — DISCHARGE INSTRUCTIONS
Follow-up with your oral surgeon on Tuesday    Take the antibiotics as directed    With any worsening symptoms questions comments or concerns

## 2024-10-18 NOTE — Clinical Note
Sabihajd Harmon was seen and treated in our emergency department on 10/18/2024.                Diagnosis: seen in ED    Sabiha  .    She may return on this date: 10/21/2024         If you have any questions or concerns, please don't hesitate to call.      Janes Jorge PA-C    ______________________________           _______________          _______________  Hospital Representative                              Date                                Time

## 2024-10-18 NOTE — ED PROVIDER NOTES
Time reflects when diagnosis was documented in both MDM as applicable and the Disposition within this note       Time User Action Codes Description Comment    10/18/2024 12:25 PM Janes Palomo Add [L08.9] Facial infection           ED Disposition       ED Disposition   Discharge    Condition   Stable    Date/Time   Fri Oct 18, 2024 12:25 PM    Comment   Sabiha Harmon discharge to home/self care.                   Assessment & Plan       Medical Decision Making  This is a 24-year-old female patient who has 4 impacted wisdom teeth and is scheduled to have them out this upcoming Tuesday.  She is having pain in her right upper jaw.  States that this jaw pain is reproducible with swollen area it hurts when she opens and closes her mouth hurts when she swallows she has a normal voice that she is not drooling her face is mildly swollen nothing makes it better or worse called OMFS told her to come to the ER for antibiotics differential diagnose include not limited to pericarditis, abscess, deep space infection, facial cellulitis    Problems Addressed:  Facial infection: acute illness or injury     Details: Patient was found to have retroantral thickening adjacent to the right maxillary near the upper wisdom tooth with consistent with infection.  Patient will be given Augmentin and follow-up with oral surgery also given hydrocodone for the pain and told to return worsening symptoms questions comments or concerns    Amount and/or Complexity of Data Reviewed  Labs: ordered. Decision-making details documented in ED Course.     Details: All labs reviewed nothing acute that requires immediate intervention  Radiology: ordered.     Details: I did review the results of the CAT scans which showed a retroantral thickening consistent with infection on the behind the right maxillary sinus near the right upper wisdom tooth which could be consistent with the patient's complaints  Discussion of management or test interpretation  with external provider(s): Using joint decision-making patient will be given Augmentin and hydrocodone follow-up with oral surgery return worsening symptoms question comes concerns verbalized understanding and agreement    Risk  Prescription drug management.             Medications   sodium chloride 0.9 % bolus 1,000 mL (0 mL Intravenous Stopped 10/18/24 1204)   clindamycin in dextrose 5% (Cleocin) IVPB (premix) 600 mg 50 mL (0 mg Intravenous Stopped 10/18/24 1237)   dexamethasone (PF) (DECADRON) injection 10 mg (10 mg Intravenous Given 10/18/24 1104)   iohexol (OMNIPAQUE) 350 MG/ML injection (MULTI-DOSE) 100 mL (85 mL Intravenous Given 10/18/24 1142)   HYDROcodone-acetaminophen (NORCO) 5-325 mg per tablet 1 tablet (1 tablet Oral Given 10/18/24 1240)       ED Risk Strat Scores                           SBIRT 20yo+      Flowsheet Row Most Recent Value   Initial Alcohol Screen: US AUDIT-C     1. How often do you have a drink containing alcohol? 0 Filed at: 10/18/2024 1042   2. How many drinks containing alcohol do you have on a typical day you are drinking?  0 Filed at: 10/18/2024 1042   3b. FEMALE Any Age, or MALE 65+: How often do you have 4 or more drinks on one occassion? 0 Filed at: 10/18/2024 1042   Audit-C Score 0 Filed at: 10/18/2024 1042   FERNIE: How many times in the past year have you...    Used an illegal drug or used a prescription medication for non-medical reasons? Never Filed at: 10/18/2024 1042                            History of Present Illness       Chief Complaint   Patient presents with    Dental Pain     Right upper molar pain and swelling. Swelling into throat. Concerned for infection. Has wisdom teeth extraction planned for Tuesday. Called  And was instructed to seek care in ED for abx.       Past Medical History:   Diagnosis Date    Kidney infection       History reviewed. No pertinent surgical history.   History reviewed. No pertinent family history.   Social History     Tobacco Use     Smoking status: Every Day     Types: E-Cigarettes    Smokeless tobacco: Never   Substance Use Topics    Alcohol use: Not Currently    Drug use: Never      E-Cigarette/Vaping      E-Cigarette/Vaping Substances      I have reviewed and agree with the history as documented.     This is a 24-year-old female patient is scheduled to have all 4 wisdom teeth out this next Tuesday.  Comes in with 2-day history of severe pain in the right upper wisdom tooth area and states that it causes also a sore throat.  She is controlling secretions does not appear to have any issues swallowing but states it hurts severely.  She hurts when she goes open upper jaw.  There is been no fever no chills no headache blurred vision double vision cough congestion.  No chest pain or shortness of breath nausea fine diarrhea abdominal pain.  Called her oral surgeon for several days to come in here for antibiotics and further evaluation.  Nothing makes it better or worse she has pain refractory to over-the-counter medication.        Review of Systems   Constitutional:  Negative for diaphoresis, fatigue and fever.   HENT:  Positive for dental problem and facial swelling. Negative for congestion, drooling, ear discharge, ear pain, hearing loss, mouth sores, nosebleeds, postnasal drip, rhinorrhea, sinus pressure, sinus pain, sneezing, sore throat, tinnitus, trouble swallowing and voice change.    Eyes:  Negative for photophobia, pain, discharge and visual disturbance.   Respiratory:  Negative for cough, choking, chest tightness, shortness of breath and wheezing.    Cardiovascular:  Negative for chest pain and palpitations.   Gastrointestinal:  Negative for abdominal distention, abdominal pain, diarrhea and vomiting.   Genitourinary:  Negative for dysuria, flank pain and frequency.   Musculoskeletal:  Negative for back pain, gait problem and joint swelling.   Skin:  Negative for color change and rash.   Neurological:  Negative for dizziness, syncope and  headaches.   Psychiatric/Behavioral:  Negative for behavioral problems and confusion. The patient is not nervous/anxious.    All other systems reviewed and are negative.          Objective       ED Triage Vitals [10/18/24 1040]   Temperature Pulse Blood Pressure Respirations SpO2 Patient Position - Orthostatic VS   (!) 97.3 °F (36.3 °C) 77 149/86 16 100 % Sitting      Temp Source Heart Rate Source BP Location FiO2 (%) Pain Score    Temporal Monitor Left arm -- 10 - Worst Possible Pain      Vitals      Date and Time Temp Pulse SpO2 Resp BP Pain Score FACES Pain Rating User   10/18/24 1230 -- 73 100 % -- 130/59 -- -- AP   10/18/24 1200 -- 70 95 % 16 117/69 -- -- AP   10/18/24 1040 97.3 °F (36.3 °C) 77 100 % 16 149/86 10 - Worst Possible Pain -- TG            Physical Exam  Vitals and nursing note reviewed.   Constitutional:       General: She is not in acute distress.     Appearance: Normal appearance. She is well-developed. She is not ill-appearing, toxic-appearing or diaphoretic.   HENT:      Head: Normocephalic and atraumatic.      Jaw: Trismus (right) present.      Nose: Nose normal.      Mouth/Throat:      Mouth: Mucous membranes are moist.     Eyes:      Extraocular Movements: Extraocular movements intact.      Conjunctiva/sclera: Conjunctivae normal.      Pupils: Pupils are equal, round, and reactive to light.   Cardiovascular:      Rate and Rhythm: Normal rate and regular rhythm.      Heart sounds: No murmur heard.  Pulmonary:      Effort: Pulmonary effort is normal. No respiratory distress.      Breath sounds: Normal breath sounds.   Abdominal:      Palpations: Abdomen is soft.      Tenderness: There is no abdominal tenderness.   Musculoskeletal:         General: No swelling.      Cervical back: Neck supple.   Skin:     General: Skin is warm and dry.      Capillary Refill: Capillary refill takes less than 2 seconds.   Neurological:      Mental Status: She is alert.   Psychiatric:         Mood and Affect: Mood  normal.         Results Reviewed       Procedure Component Value Units Date/Time    Strep A PCR [[id number]]  (Normal) Collected: 10/18/24 1106    Lab Status: Final result Specimen: Throat Updated: 10/18/24 1146     STREP A PCR Not Detected    Comprehensive metabolic panel [[id number]] Collected: 10/18/24 1106    Lab Status: Final result Specimen: Blood from Arm, Left Updated: 10/18/24 1130     Sodium 139 mmol/L      Potassium 3.9 mmol/L      Chloride 106 mmol/L      CO2 27 mmol/L      ANION GAP 6 mmol/L      BUN 9 mg/dL      Creatinine 0.68 mg/dL      Glucose 88 mg/dL      Calcium 9.6 mg/dL      AST 13 U/L      ALT 11 U/L      Alkaline Phosphatase 86 U/L      Total Protein 7.7 g/dL      Albumin 4.4 g/dL      Total Bilirubin 0.31 mg/dL      eGFR 121 ml/min/1.73sq m     Narrative:      National Kid[employer] Disease Foundation guidelines for Chronic Kidney Disease (CKD):     Stage 1 with normal or high GFR (GFR > 90 mL/min/1.73 square meters)    Stage 2 Mild CKD (GFR = 60-89 mL/min/1.73 square meters)    Stage 3A Moderate CKD (GFR = 45-59 mL/min/1.73 square meters)    Stage 3B Moderate CKD (GFR = 30-44 mL/min/1.73 square meters)    Stage 4 Severe CKD (GFR = 15-29 mL/min/1.73 square meters)    Stage 5 End Stage CKD (GFR <15 mL/min/1.73 square meters)  Note: GFR calculation is accurate only with a steady state creatinine    CBC and differential [[id number]]  (Abnormal) Collected: 10/18/24 1106    Lab Status: Final result Specimen: Blood from Arm, Left Updated: 10/18/24 1113     WBC 9.88 Thousand/uL      RBC 5.25 Million/uL      Hemoglobin 14.3 g/dL      Hematocrit 43.8 %      MCV 83 fL      MCH 27.2 pg      MCHC 32.6 g/dL      RDW 13.4 %      MPV 11.1 fL      Platelets 383 Thousands/uL      nRBC 0 /100 WBCs      Segmented % 65 %      Immature Grans % 1 %      Lymphocytes % 26 %      Monocytes % 6 %      Eosinophils Relative 1 %      Basophils Relative 1 %      Absolute Neutrophils 6.55 Thousands/µL      Absolute Immature Grans  0.05 Thousand/uL      Absolute Lymphocytes 2.59 Thousands/µL      Absolute Monocytes 0.55 Thousand/µL      Eosinophils Absolute 0.09 Thousand/µL      Basophils Absolute 0.05 Thousands/µL     POCT pregnancy, urine [[id number]]  (Normal) Resulted: 10/18/24 1109    Lab Status: Final result Updated: 10/18/24 1109     EXT Preg Test, Ur Negative     Control Valid            CT soft tissue neck with contrast   Final Interpretation by Karsten Ziegler MD (10/18 1202)      Mild fat stranding in right retroantral fat, which may be infectious/inflammatory. No abscess, as clinically questioned. Recommend follow-up with dentist for right upper wisdom tooth indication.      Additional chronic/incidental findings as detailed above.      The study was marked in EPIC for immediate notification.                                    Workstation performed: [id number]             Procedures    ED Medication and Procedure Management   Prior to Admission Medications   Prescriptions Last Dose Informant Patient Reported? Taking?   AZO-CRANBERRY PO  Self Yes No   Sig: Take by mouth   Patient not taking: Reported on 10/28/2022   drospirenone-estradiol (ANGELIQ) 0.5-1 MG per tablet  Self Yes No   Sig: Take 1 tablet by mouth daily   Patient not taking: Reported on 10/28/2022      Facility-Administered Medications: None     Discharge Medication List as of 10/18/2024 12:31 PM        START taking these medications    Details   amoxicillin-clavulanate (AUGMENTIN) 875-125 mg per tablet Take 1 tablet by mouth every 12 (twelve) hours for 7 days, Starting Fri 10/18/2024, Until Fri 10/25/2024, Normal      oxyCODONE (Roxicodone) 5 immediate release tablet Take 1 tablet (5 mg total) by mouth every 6 (six) hours as needed for moderate pain Max Daily Amount: 20 mg, Starting Fri 10/18/2024, Normal           CONTINUE these medications which have NOT CHANGED    Details   AZO-CRANBERRY PO Take by mouth, Historical Med      drospirenone-estradiol (ANGELIQ)  0.5-1 MG per tablet Take 1 tablet by mouth daily, Historical Med           No discharge procedures on file.  ED SEPSIS DOCUMENTATION   Time reflects when diagnosis was documented in both MDM as applicable and the Disposition within this note       Time User Action Codes Description Comment    10/18/2024 12:25 PM Janes Jorge Add [L08.9] Facial infection                  Janes Jorge PA-C  10/18/24 1603

## 2024-10-30 ENCOUNTER — APPOINTMENT (EMERGENCY)
Dept: RADIOLOGY | Facility: HOSPITAL | Age: 24
End: 2024-10-30
Payer: COMMERCIAL

## 2024-10-30 ENCOUNTER — HOSPITAL ENCOUNTER (EMERGENCY)
Facility: HOSPITAL | Age: 24
Discharge: HOME | End: 2024-10-30
Attending: EMERGENCY MEDICINE
Payer: COMMERCIAL

## 2024-10-30 VITALS
RESPIRATION RATE: 20 BRPM | WEIGHT: 144 LBS | HEART RATE: 98 BPM | OXYGEN SATURATION: 98 % | TEMPERATURE: 98.3 F | DIASTOLIC BLOOD PRESSURE: 58 MMHG | HEIGHT: 62 IN | SYSTOLIC BLOOD PRESSURE: 105 MMHG | BODY MASS INDEX: 26.5 KG/M2

## 2024-10-30 DIAGNOSIS — J18.9 PNEUMONIA: Primary | ICD-10-CM

## 2024-10-30 LAB
ALBUMIN SERPL BCG-MCNC: 4.1 G/DL (ref 3.5–5)
ALP SERPL-CCNC: 88 U/L (ref 34–104)
ALT SERPL W P-5'-P-CCNC: 10 U/L (ref 7–52)
ANION GAP SERPL CALCULATED.3IONS-SCNC: 8 MMOL/L (ref 4–13)
APTT PPP: 29 SECONDS (ref 23–34)
AST SERPL W P-5'-P-CCNC: 10 U/L (ref 13–39)
ATRIAL RATE: 146 BPM
BASOPHILS # BLD AUTO: 0.08 THOUSANDS/ΜL (ref 0–0.1)
BASOPHILS NFR BLD AUTO: 0 % (ref 0–1)
BILIRUB SERPL-MCNC: 0.71 MG/DL (ref 0.2–1)
BILIRUB UR QL STRIP: NEGATIVE
BUN SERPL-MCNC: 12 MG/DL (ref 5–25)
CALCIUM SERPL-MCNC: 9.1 MG/DL (ref 8.4–10.2)
CARDIAC TROPONIN I PNL SERPL HS: <2 NG/L
CHLORIDE SERPL-SCNC: 106 MMOL/L (ref 96–108)
CLARITY UR: CLEAR
CO2 SERPL-SCNC: 22 MMOL/L (ref 21–32)
COLOR UR: YELLOW
CREAT SERPL-MCNC: 0.7 MG/DL (ref 0.6–1.3)
EOSINOPHIL # BLD AUTO: 0.02 THOUSAND/ΜL (ref 0–0.61)
EOSINOPHIL NFR BLD AUTO: 0 % (ref 0–6)
ERYTHROCYTE [DISTWIDTH] IN BLOOD BY AUTOMATED COUNT: 14 % (ref 11.6–15.1)
FLUAV AG UPPER RESP QL IA.RAPID: NEGATIVE
FLUBV AG UPPER RESP QL IA.RAPID: NEGATIVE
GFR SERPL CREATININE-BSD FRML MDRD: 120 ML/MIN/1.73SQ M
GLUCOSE SERPL-MCNC: 123 MG/DL (ref 65–140)
GLUCOSE UR STRIP-MCNC: NEGATIVE MG/DL
HCG SERPL QL: NEGATIVE
HCT VFR BLD AUTO: 39.7 % (ref 34.8–46.1)
HGB BLD-MCNC: 12.9 G/DL (ref 11.5–15.4)
HGB UR QL STRIP.AUTO: NEGATIVE
IMM GRANULOCYTES # BLD AUTO: 0.17 THOUSAND/UL (ref 0–0.2)
IMM GRANULOCYTES NFR BLD AUTO: 1 % (ref 0–2)
INR PPP: 1.09 (ref 0.85–1.19)
KETONES UR STRIP-MCNC: ABNORMAL MG/DL
LACTATE SERPL-SCNC: 0.9 MMOL/L (ref 0.5–2)
LEUKOCYTE ESTERASE UR QL STRIP: NEGATIVE
LYMPHOCYTES # BLD AUTO: 1 THOUSANDS/ΜL (ref 0.6–4.47)
LYMPHOCYTES NFR BLD AUTO: 4 % (ref 14–44)
MCH RBC QN AUTO: 27.2 PG (ref 26.8–34.3)
MCHC RBC AUTO-ENTMCNC: 32.5 G/DL (ref 31.4–37.4)
MCV RBC AUTO: 84 FL (ref 82–98)
MONOCYTES # BLD AUTO: 1.04 THOUSAND/ΜL (ref 0.17–1.22)
MONOCYTES NFR BLD AUTO: 4 % (ref 4–12)
NEUTROPHILS # BLD AUTO: 26.06 THOUSANDS/ΜL (ref 1.85–7.62)
NEUTS SEG NFR BLD AUTO: 91 % (ref 43–75)
NITRITE UR QL STRIP: NEGATIVE
NRBC BLD AUTO-RTO: 0 /100 WBCS
P AXIS: 75 DEGREES
PH UR STRIP.AUTO: 6 [PH]
PLATELET # BLD AUTO: 344 THOUSANDS/UL (ref 149–390)
PMV BLD AUTO: 10.9 FL (ref 8.9–12.7)
POTASSIUM SERPL-SCNC: 3.6 MMOL/L (ref 3.5–5.3)
PR INTERVAL: 120 MS
PROCALCITONIN SERPL-MCNC: 0.08 NG/ML
PROT SERPL-MCNC: 7.2 G/DL (ref 6.4–8.4)
PROT UR STRIP-MCNC: NEGATIVE MG/DL
PROTHROMBIN TIME: 14.6 SECONDS (ref 12.3–15)
QRS AXIS: 83 DEGREES
QRSD INTERVAL: 80 MS
QT INTERVAL: 346 MS
QTC INTERVAL: 539 MS
RBC # BLD AUTO: 4.75 MILLION/UL (ref 3.81–5.12)
SARS-COV+SARS-COV-2 AG RESP QL IA.RAPID: NEGATIVE
SODIUM SERPL-SCNC: 136 MMOL/L (ref 135–147)
SP GR UR STRIP.AUTO: 1.02
T WAVE AXIS: 65 DEGREES
UROBILINOGEN UR QL STRIP.AUTO: 0.2 E.U./DL
VENTRICULAR RATE: 146 BPM
WBC # BLD AUTO: 28.37 THOUSAND/UL (ref 4.31–10.16)

## 2024-10-30 PROCEDURE — 81003 URINALYSIS AUTO W/O SCOPE: CPT | Performed by: EMERGENCY MEDICINE

## 2024-10-30 PROCEDURE — 87804 INFLUENZA ASSAY W/OPTIC: CPT | Performed by: EMERGENCY MEDICINE

## 2024-10-30 PROCEDURE — 85730 THROMBOPLASTIN TIME PARTIAL: CPT | Performed by: EMERGENCY MEDICINE

## 2024-10-30 PROCEDURE — 36415 COLL VENOUS BLD VENIPUNCTURE: CPT | Performed by: EMERGENCY MEDICINE

## 2024-10-30 PROCEDURE — 96365 THER/PROPH/DIAG IV INF INIT: CPT

## 2024-10-30 PROCEDURE — 96367 TX/PROPH/DG ADDL SEQ IV INF: CPT

## 2024-10-30 PROCEDURE — 80053 COMPREHEN METABOLIC PANEL: CPT | Performed by: EMERGENCY MEDICINE

## 2024-10-30 PROCEDURE — 83605 ASSAY OF LACTIC ACID: CPT | Performed by: EMERGENCY MEDICINE

## 2024-10-30 PROCEDURE — 84703 CHORIONIC GONADOTROPIN ASSAY: CPT | Performed by: EMERGENCY MEDICINE

## 2024-10-30 PROCEDURE — 87811 SARS-COV-2 COVID19 W/OPTIC: CPT | Performed by: EMERGENCY MEDICINE

## 2024-10-30 PROCEDURE — 85025 COMPLETE CBC W/AUTO DIFF WBC: CPT | Performed by: EMERGENCY MEDICINE

## 2024-10-30 PROCEDURE — 99284 EMERGENCY DEPT VISIT MOD MDM: CPT | Performed by: EMERGENCY MEDICINE

## 2024-10-30 PROCEDURE — 84484 ASSAY OF TROPONIN QUANT: CPT | Performed by: EMERGENCY MEDICINE

## 2024-10-30 PROCEDURE — 99285 EMERGENCY DEPT VISIT HI MDM: CPT

## 2024-10-30 PROCEDURE — 85610 PROTHROMBIN TIME: CPT | Performed by: EMERGENCY MEDICINE

## 2024-10-30 PROCEDURE — 71045 X-RAY EXAM CHEST 1 VIEW: CPT

## 2024-10-30 PROCEDURE — 87040 BLOOD CULTURE FOR BACTERIA: CPT | Performed by: EMERGENCY MEDICINE

## 2024-10-30 PROCEDURE — 93010 ELECTROCARDIOGRAM REPORT: CPT | Performed by: INTERNAL MEDICINE

## 2024-10-30 PROCEDURE — 93005 ELECTROCARDIOGRAM TRACING: CPT

## 2024-10-30 PROCEDURE — 84145 PROCALCITONIN (PCT): CPT | Performed by: EMERGENCY MEDICINE

## 2024-10-30 RX ORDER — AZITHROMYCIN 250 MG/1
250 TABLET, FILM COATED ORAL EVERY 24 HOURS
Qty: 4 TABLET | Refills: 0 | Status: SHIPPED | OUTPATIENT
Start: 2024-10-30 | End: 2024-11-03

## 2024-10-30 RX ORDER — CEFTRIAXONE 1 G/50ML
1000 INJECTION, SOLUTION INTRAVENOUS ONCE
Status: COMPLETED | OUTPATIENT
Start: 2024-10-30 | End: 2024-10-30

## 2024-10-30 RX ORDER — IBUPROFEN 600 MG/1
600 TABLET, FILM COATED ORAL ONCE
Status: COMPLETED | OUTPATIENT
Start: 2024-10-30 | End: 2024-10-30

## 2024-10-30 RX ORDER — ACETAMINOPHEN 325 MG/1
975 TABLET ORAL ONCE
Status: COMPLETED | OUTPATIENT
Start: 2024-10-30 | End: 2024-10-30

## 2024-10-30 RX ORDER — CEFPODOXIME PROXETIL 200 MG/1
200 TABLET, FILM COATED ORAL 2 TIMES DAILY
Qty: 14 TABLET | Refills: 0 | Status: SHIPPED | OUTPATIENT
Start: 2024-10-30 | End: 2024-11-06

## 2024-10-30 RX ADMIN — AZITHROMYCIN MONOHYDRATE 500 MG: 500 INJECTION, POWDER, LYOPHILIZED, FOR SOLUTION INTRAVENOUS at 08:50

## 2024-10-30 RX ADMIN — ACETAMINOPHEN 975 MG: 325 TABLET ORAL at 08:03

## 2024-10-30 RX ADMIN — CEFTRIAXONE 1000 MG: 1 INJECTION, SOLUTION INTRAVENOUS at 08:21

## 2024-10-30 RX ADMIN — SODIUM CHLORIDE 1000 ML: 0.9 INJECTION, SOLUTION INTRAVENOUS at 09:15

## 2024-10-30 RX ADMIN — SODIUM CHLORIDE 1000 ML: 0.9 INJECTION, SOLUTION INTRAVENOUS at 07:56

## 2024-10-30 RX ADMIN — IBUPROFEN 600 MG: 600 TABLET, FILM COATED ORAL at 08:03

## 2024-10-30 NOTE — Clinical Note
accompanied Sabiha Harmon to the emergency department on 10/30/2024.    Return date if applicable: 11/02/2024        If you have any questions or concerns, please don't hesitate to call.      Fan Au, DO

## 2024-10-30 NOTE — Clinical Note
Sabiha Harmon was seen and treated in our emergency department on 10/30/2024.    No restrictions            Diagnosis:     Sabiha  .    She may return on this date: 11/02/2024         If you have any questions or concerns, please don't hesitate to call.      Fan Au, DO    ______________________________           _______________          _______________  Hospital Representative                              Date                                Time

## 2024-10-30 NOTE — ED PROVIDER NOTES
"Time reflects when diagnosis was documented in both MDM as applicable and the Disposition within this note       Time User Action Codes Description Comment    10/30/2024 10:19 AM Fan Au Add [J18.9] Pneumonia           ED Disposition       ED Disposition   Discharge    Condition   Stable    Date/Time   Wed Oct 30, 2024 10:20 AM    Comment   Sabiha Maishapinky discharge to home/self care.                   Assessment & Plan       Medical Decision Making  24 F presents with cough myalgias fevers last day.  Patient had recent upper respiratory infection and was treated with antibiotics for impacted wisdom teeth which were removed recently.  The pain in her mouth is improving.  She does have a sore throat and a cough however she feels that her cough cannot \"get anything out\".    On exam she is tachycardic and febrile concerning for sepsis.  Will send sepsis labs.  She does have some mild rhonchi in the left lower lobe concerning for bacterial versus viral pneumonia.  Will get chest x-ray.    Problems Addressed:  Pneumonia: acute illness or injury    Amount and/or Complexity of Data Reviewed  Labs: ordered. Decision-making details documented in ED Course.  Radiology: ordered and independent interpretation performed. Decision-making details documented in ED Course.    Risk  OTC drugs.  Prescription drug management.        ED Course as of 10/30/24 1221   Wed Oct 30, 2024   0807 WBC(!): 28.37  Broad spectrum antibiotics ordered   0920 Heart rate improving.  Patient does meet sepsis criteria although I think the fever is causing her tachycardia.  Respiratory rate at this point is normal.  If vital signs fully correct after IV fluids and antipyretics and patient is comfortable with the plan I think it is reasonable to trial outpatient treatment as patient is young and healthy and has no significant risk factors.  I would have a low threshold to admit the patient   1024 I long conversation with Linh as well as her " partner about admission versus discharge.  Her vital signs improved after medication in the emergency department however she does have concerning leukocytosis.  Suspect this is in part of a robust immune response, but cannot exclude sepsis until blood cultures as well.  I informed him that the safest thing to do would be to be admitted to the hospital however she has young children and would like to be at home for them.  Given that she is saturating well on room air and her vital signs improved with treatment in the emergency department I think it is reasonable to trial a course of outpatient antibiotics as she also received acute 24-hour antibiotics in the emergency department.  She was given very strict return precautions to return if symptoms worsen or if new symptoms develop.       Medications   sodium chloride 0.9 % bolus 1,000 mL (0 mL Intravenous Stopped 10/30/24 0826)   acetaminophen (TYLENOL) tablet 975 mg (975 mg Oral Given 10/30/24 0803)   ibuprofen (MOTRIN) tablet 600 mg (600 mg Oral Given 10/30/24 0803)   cefTRIAXone (ROCEPHIN) IVPB (premix in dextrose) 1,000 mg 50 mL (0 mg Intravenous Stopped 10/30/24 0850)   azithromycin (ZITHROMAX) 500 mg in sodium chloride 0.9 % 250 mL IVPB (0 mg Intravenous Stopped 10/30/24 1016)   sodium chloride 0.9 % bolus 1,000 mL (0 mL Intravenous Stopped 10/30/24 1016)       ED Risk Strat Scores                           SBIRT 22yo+      Flowsheet Row Most Recent Value   Initial Alcohol Screen: US AUDIT-C     1. How often do you have a drink containing alcohol? 0 Filed at: 10/30/2024 0751   2. How many drinks containing alcohol do you have on a typical day you are drinking?  0 Filed at: 10/30/2024 0751   3a. Male UNDER 65: How often do you have five or more drinks on one occasion? 0 Filed at: 10/30/2024 0751   3b. FEMALE Any Age, or MALE 65+: How often do you have 4 or more drinks on one occassion? 0 Filed at: 10/30/2024 0751   Audit-C Score 0 Filed at: 10/30/2024 0751  "  FERNIE: How many times in the past year have you...    Used an illegal drug or used a prescription medication for non-medical reasons? Never Filed at: 10/30/2024 0751                            History of Present Illness       Chief Complaint   Patient presents with    Flu Symptoms     Pt c/o 2 days of cough, body aches, migraine, sore throat, chills, fatigue. States \"I've never felt this sick in my life\".  Had wisdom teeth removed 10/22, was started on amoxicillin 10/18 for dental infection, still taking.        Past Medical History:   Diagnosis Date    Kidney infection       Past Surgical History:   Procedure Laterality Date     SECTION      WISDOM TOOTH EXTRACTION        History reviewed. No pertinent family history.   Social History     Tobacco Use    Smoking status: Never    Smokeless tobacco: Never   Vaping Use    Vaping status: Never Used   Substance Use Topics    Alcohol use: Not Currently    Drug use: Never      E-Cigarette/Vaping    E-Cigarette Use Never User       E-Cigarette/Vaping Substances      I have reviewed and agree with the history as documented.     24-year-old female presents with cough fever chills malaise.  Had recent dental work.  On amoxicillin.      Flu Symptoms      Review of Systems        Objective       ED Triage Vitals   Temperature Pulse Blood Pressure Respirations SpO2 Patient Position - Orthostatic VS   10/30/24 0748 10/30/24 0748 10/30/24 0751 10/30/24 0748 10/30/24 0748 10/30/24 0800   100.2 °F (37.9 °C) (!) 146 126/73 20 98 % Sitting      Temp Source Heart Rate Source BP Location FiO2 (%) Pain Score    10/30/24 0748 10/30/24 0800 10/30/24 0800 -- 10/30/24 0748    Tympanic Monitor Left arm  7      Vitals      Date and Time Temp Pulse SpO2 Resp BP Pain Score FACES Pain Rating User   10/30/24 0945 98.3 °F (36.8 °C) 98 98 % 20 105/58 -- -- AMF   10/30/24 0923 -- 95 -- 18 -- -- -- GS   10/30/24 0915 -- 114 98 % 23 105/57 -- -- AMF   10/30/24 0905 -- 108 -- -- -- -- -- GS "   10/30/24 0900 -- 104 97 % 24 107/60 -- -- AMF   10/30/24 0845 -- 115 97 % 20 114/62 -- -- AMF   10/30/24 0830 -- 119 98 % 25 122/66 -- -- AMF   10/30/24 0815 -- 115 100 % 24 114/62 -- -- AMF   10/30/24 0804 -- 120 -- 20 122/68 -- -- AMF   10/30/24 0803 -- -- -- -- -- Med Not Given for Pain - for MAR use only -- AMF   10/30/24 0800 -- 117 98 % 21 122/68 No Pain -- AMF   10/30/24 0753 100.5 °F (38.1 °C) -- -- -- -- -- -- STEW   10/30/24 0751 -- -- -- -- 126/73 -- -- STEW   10/30/24 0748 100.2 °F (37.9 °C) 146 98 % 20 -- 7 -- STEW            Physical Exam  Vitals and nursing note reviewed.   Constitutional:       Appearance: She is normal weight.   HENT:      Head: Normocephalic and atraumatic.      Comments: No evidence of dental infection.  Some mild pharyngeal pillar erythema  Eyes:      Conjunctiva/sclera: Conjunctivae normal.      Pupils: Pupils are equal, round, and reactive to light.   Cardiovascular:      Rate and Rhythm: Normal rate and regular rhythm.   Pulmonary:      Effort: Pulmonary effort is normal. No respiratory distress.      Breath sounds: Rhonchi present.   Chest:      Chest wall: Tenderness: lll.   Abdominal:      General: Abdomen is flat. There is no distension.   Musculoskeletal:         General: No swelling or deformity.      Cervical back: Normal range of motion and neck supple.   Skin:     General: Skin is dry.      Coloration: Skin is not jaundiced.   Neurological:      General: No focal deficit present.      Mental Status: She is alert and oriented to person, place, and time.   Psychiatric:         Mood and Affect: Mood normal.         Thought Content: Thought content normal.         Results Reviewed       Procedure Component Value Units Date/Time    HS Troponin I 4hr [[id number]]     Lab Status: No result Specimen: Blood     HS Troponin 0hr (reflex protocol) [[id number]]  (Normal) Collected: 10/30/24 0907    Lab Status: Final result Specimen: Blood from Arm, Right Updated: 10/30/24 0936     hs  TnI 0hr <2 ng/L     HS Troponin I 2hr [[id number]]     Lab Status: No result Specimen: Blood     UA w Reflex to Microscopic w Reflex to Culture [[id number]]  (Abnormal) Collected: 10/30/24 0851    Lab Status: Final result Specimen: Urine, Clean Catch Updated: 10/30/24 0901     Color, UA Yellow     Clarity, UA Clear     Specific Gravity, UA 1.020     pH, UA 6.0     Leukocytes, UA Negative     Nitrite, UA Negative     Protein, UA Negative mg/dl      Glucose, UA Negative mg/dl      Ketones, UA 40 (2+) mg/dl      Urobilinogen, UA 0.2 E.U./dl      Bilirubin, UA Negative     Occult Blood, UA Negative    CBC and differential [[id number]]  (Abnormal) Collected: 10/30/24 0755    Lab Status: Final result Specimen: Blood from Arm, Right Updated: 10/30/24 0829     WBC 28.37 Thousand/uL      RBC 4.75 Million/uL      Hemoglobin 12.9 g/dL      Hematocrit 39.7 %      MCV 84 fL      MCH 27.2 pg      MCHC 32.5 g/dL      RDW 14.0 %      MPV 10.9 fL      Platelets 344 Thousands/uL      nRBC 0 /100 WBCs      Segmented % 91 %      Immature Grans % 1 %      Lymphocytes % 4 %      Monocytes % 4 %      Eosinophils Relative 0 %      Basophils Relative 0 %      Absolute Neutrophils 26.06 Thousands/µL      Absolute Immature Grans 0.17 Thousand/uL      Absolute Lymphocytes 1.00 Thousands/µL      Absolute Monocytes 1.04 Thousand/µL      Eosinophils Absolute 0.02 Thousand/µL      Basophils Absolute 0.08 Thousands/µL     Narrative:      This is an appended report.  These results have been appended to a previously verified report.    Procalcitonin [[id number]]  (Normal) Collected: 10/30/24 0755    Lab Status: Final result Specimen: Blood from Arm, Right Updated: 10/30/24 0828     Procalcitonin 0.08 ng/ml     hCG, qualitative pregnancy [[id number]]  (Normal) Collected: 10/30/24 0755    Lab Status: Final result Specimen: Blood from Arm, Right Updated: 10/30/24 0826     Preg, Serum Negative    FLU/COVID Rapid Antigen (30 min. TAT) - Preferred screening  test in ED [[id number]]  (Normal) Collected: 10/30/24 0755    Lab Status: Final result Specimen: Nares from Nose Updated: 10/30/24 0821     SARS COV Rapid Antigen Negative     Influenza A Rapid Antigen Negative     Influenza B Rapid Antigen Negative    Narrative:      This test has been performed using the [employer] Neena 2 FLU+SARS Antigen test under the Emergency Use Authorization (EUA). This test has been validated by the  and verified by the performing laboratory. The Neena uses lateral flow immunofluorescent sandwich assay to detect SARS-COV, Influenza A and Influenza B Antigen.     The [employer] Neena 2 SARS Antigen test does not differentiate between SARS-CoV and SARS-CoV-2.     Negative results are presumptive and may be confirmed with a molecular assay, if necessary, for patient management. Negative results do not rule out SARS-CoV-2 or influenza infection and should not be used as the sole basis for treatment or patient management decisions. A negative test result may occur if the level of antigen in a sample is below the limit of detection of this test.     Positive results are indicative of the presence of viral antigens, but do not rule out bacterial infection or co-infection with other viruses.     All test results should be used as an adjunct to clinical observations and other information available to the provider.    FOR PEDIATRIC PATIENTS - copy/paste COVID Guidelines URL to browser: [email or web address]    Comprehensive metabolic panel [[id number]]  (Abnormal) Collected: 10/30/24 0755    Lab Status: Final result Specimen: Blood from Arm, Right Updated: 10/30/24 0818     Sodium 136 mmol/L      Potassium 3.6 mmol/L      Chloride 106 mmol/L      CO2 22 mmol/L      ANION GAP 8 mmol/L      BUN 12 mg/dL      Creatinine 0.70 mg/dL      Glucose 123 mg/dL      Calcium 9.1 mg/dL      AST 10 U/L      ALT 10 U/L      Alkaline Phosphatase 88 U/L       Total Protein 7.2 g/dL      Albumin 4.1 g/dL      Total Bilirubin 0.71 mg/dL      eGFR 120 ml/min/1.73sq m     Narrative:      National Kid[employer] Disease Foundation guidelines for Chronic Kidney Disease (CKD):     Stage 1 with normal or high GFR (GFR > 90 mL/min/1.73 square meters)    Stage 2 Mild CKD (GFR = 60-89 mL/min/1.73 square meters)    Stage 3A Moderate CKD (GFR = 45-59 mL/min/1.73 square meters)    Stage 3B Moderate CKD (GFR = 30-44 mL/min/1.73 square meters)    Stage 4 Severe CKD (GFR = 15-29 mL/min/1.73 square meters)    Stage 5 End Stage CKD (GFR <15 mL/min/1.73 square meters)  Note: GFR calculation is accurate only with a steady state creatinine    Lactic acid [[id number]]  (Normal) Collected: 10/30/24 0755    Lab Status: Final result Specimen: Blood from Arm, Right Updated: 10/30/24 0817     LACTIC ACID 0.9 mmol/L     Narrative:      Result may be elevated if tourniquet was used during collection.    Protime-INR [[id number]]  (Normal) Collected: 10/30/24 0755    Lab Status: Final result Specimen: Blood from Arm, Right Updated: 10/30/24 0817     Protime 14.6 seconds      INR 1.09    Narrative:      INR Therapeutic Range    Indication                                             INR Range      Atrial Fibrillation                                               2.0-3.0  Hypercoagulable State                                    2.0.2.3  Left Ventricular Asist Device                            2.0-3.0  Mechanical Heart Valve                                  -    Aortic(with afib, MI, embolism, HF, LA enlargement,    and/or coagulopathy)                                     2.0-3.0 (2.5-3.5)     Mitral                                                             2.5-3.5  Prosthetic/Bioprosthetic Heart Valve               2.0-3.0  Venous thromboembolism (VTE: VT, PE        2.0-3.0    APTT [[id number]]  (Normal) Collected: 10/30/24 0755    Lab Status: Final result Specimen: Blood from Arm, Right Updated: 10/30/24 0817      PTT 29 seconds     Blood culture #1 [[id number]] Collected: 10/30/24 0801    Lab Status: In process Specimen: Blood from Arm, Left Updated: 10/30/24 0806    Blood culture #2 [[id number]] Collected: 10/30/24 0755    Lab Status: In process Specimen: Blood from Arm, Right Updated: 10/30/24 0801            XR chest 1 view portable   Final Interpretation by Trent Melton MD (10/30 0941)      No focal consolidation, pleural effusion, or pneumothorax.            Resident: Ramez Orellana I, the attending radiologist, have reviewed the images and agree with the final report above.      Workstation performed: [id number]             Procedures    ED Medication and Procedure Management   Prior to Admission Medications   Prescriptions Last Dose Informant Patient Reported? Taking?   HYDROcodone-acetaminophen (Norco) 5-325 mg per tablet More than a month  No No   Sig: Take 1 tablet by mouth every 6 (six) hours as needed for pain Max Daily Amount: 4 tablets      Facility-Administered Medications: None     Discharge Medication List as of 10/30/2024 10:24 AM        START taking these medications    Details   azithromycin (ZITHROMAX) 250 mg tablet Take 1 tablet (250 mg total) by mouth every 24 hours for 4 days, Starting Wed 10/30/2024, Until Sun 11/3/2024, Normal      cefpodoxime (VANTIN) 200 mg tablet Take 1 tablet (200 mg total) by mouth 2 (two) times a day for 7 days, Starting Wed 10/30/2024, Until Wed 11/6/2024, Normal           CONTINUE these medications which have NOT CHANGED    Details   HYDROcodone-acetaminophen (Norco) 5-325 mg per tablet Take 1 tablet by mouth every 6 (six) hours as needed for pain Max Daily Amount: 4 tablets, Starting Fri 10/18/2024, Normal           No discharge procedures on file.  ED SEPSIS DOCUMENTATION   Time reflects when diagnosis was documented in both MDM as applicable and the Disposition within this note       Time User Action Codes Description Comment    10/30/2024 10:19 AM Roe  Fan Alba [J18.9] Pneumonia                  Fan Au, DO  10/30/24 1224

## 2024-10-30 NOTE — DISCHARGE INSTRUCTIONS
I suspect you have pneumonia as you have abnormal lung sounds in your left lung, fever, elevated white blood cell count.  Your chest x-ray is nondiagnostic however x-rays have poor sensitivity to pneumonia and may only pick it up about 50% of the time.    We discussed admission versus discharge with you and you agreed with discharge however if symptoms worsen or if new symptoms develop you should return to the emergency department soon as possible.  If you develop fever take 600 mg ibuprofen and 650 mg of Tylenol.  I recommend that you follow-up with your primary physician for reevaluation sometime next week.  Start taking antibiotics prescribed tomorrow.  Drink plenty of fluids such as Gatorade, Pedialyte, chicken broth to keep yourself hydrated and replenish your electrolytes.

## 2024-10-30 NOTE — Clinical Note
Sabiha Harmon was seen and treated in our emergency department on 10/30/2024.    No restrictions            Diagnosis:     Saibha  .    She may return on this date: 11/02/2024         If you have any questions or concerns, please don't hesitate to call.      Fan Au, DO    ______________________________           _______________          _______________  Hospital Representative                              Date                                Time

## 2024-11-03 LAB
BACTERIA BLD CULT: NORMAL
BACTERIA BLD CULT: NORMAL

## 2024-11-04 LAB
BACTERIA BLD CULT: NORMAL
BACTERIA BLD CULT: NORMAL